# Patient Record
Sex: FEMALE | Race: WHITE
[De-identification: names, ages, dates, MRNs, and addresses within clinical notes are randomized per-mention and may not be internally consistent; named-entity substitution may affect disease eponyms.]

---

## 2021-10-07 ENCOUNTER — HOSPITAL ENCOUNTER (EMERGENCY)
Dept: HOSPITAL 54 - ER | Age: 81
LOS: 1 days | Discharge: HOME | End: 2021-10-08
Payer: MEDICARE

## 2021-10-07 VITALS — BODY MASS INDEX: 30.82 KG/M2 | HEIGHT: 60 IN | WEIGHT: 157 LBS

## 2021-10-07 DIAGNOSIS — R53.1: Primary | ICD-10-CM

## 2021-10-07 DIAGNOSIS — Z86.16: ICD-10-CM

## 2021-10-07 DIAGNOSIS — I10: ICD-10-CM

## 2021-10-07 LAB
ALBUMIN SERPL BCP-MCNC: 3.6 G/DL (ref 3.4–5)
ALP SERPL-CCNC: 66 U/L (ref 46–116)
ALT SERPL W P-5'-P-CCNC: 34 U/L (ref 12–78)
AST SERPL W P-5'-P-CCNC: 36 U/L (ref 15–37)
BASOPHILS # BLD AUTO: 0 K/UL (ref 0–0.2)
BASOPHILS NFR BLD AUTO: 0.5 % (ref 0–2)
BILIRUB DIRECT SERPL-MCNC: 0.2 MG/DL (ref 0–0.2)
BILIRUB SERPL-MCNC: 0.6 MG/DL (ref 0.2–1)
BUN SERPL-MCNC: 11 MG/DL (ref 7–18)
CALCIUM SERPL-MCNC: 7.9 MG/DL (ref 8.5–10.1)
CHLORIDE SERPL-SCNC: 100 MMOL/L (ref 98–107)
CO2 SERPL-SCNC: 28 MMOL/L (ref 21–32)
CREAT SERPL-MCNC: 0.8 MG/DL (ref 0.6–1.3)
EOSINOPHIL NFR BLD AUTO: 0 % (ref 0–6)
GLUCOSE SERPL-MCNC: 118 MG/DL (ref 74–106)
HCT VFR BLD AUTO: 41 % (ref 33–45)
HGB BLD-MCNC: 13.6 G/DL (ref 11.5–14.8)
LYMPHOCYTES NFR BLD AUTO: 0.9 K/UL (ref 0.8–4.8)
LYMPHOCYTES NFR BLD AUTO: 23 % (ref 20–44)
MCHC RBC AUTO-ENTMCNC: 33 G/DL (ref 31–36)
MCV RBC AUTO: 89 FL (ref 82–100)
MONOCYTES NFR BLD AUTO: 0.3 K/UL (ref 0.1–1.3)
MONOCYTES NFR BLD AUTO: 8.1 % (ref 2–12)
NEUTROPHILS # BLD AUTO: 2.6 K/UL (ref 1.8–8.9)
NEUTROPHILS NFR BLD AUTO: 68.4 % (ref 43–81)
PLATELET # BLD AUTO: 109 K/UL (ref 150–450)
POTASSIUM SERPL-SCNC: 3.7 MMOL/L (ref 3.5–5.1)
PROT SERPL-MCNC: 7.3 G/DL (ref 6.4–8.2)
RBC # BLD AUTO: 4.61 MIL/UL (ref 4–5.2)
SODIUM SERPL-SCNC: 138 MMOL/L (ref 136–145)
WBC NRBC COR # BLD AUTO: 3.8 K/UL (ref 4.3–11)

## 2021-10-07 NOTE — NUR
PATIENT BIBRA 878 PT FROM HOME C/O GENERAL WEAKNESS. PT TESTED POSTIVE FOR 
COVID 7 DAYS AGO. PATIENT IS A/O X 4, RR EVEN AND UNLABORED, NO SOB NOTED. 
PATIENT CONNECTED TO CARDIAC MONITOR AND POX.

## 2021-10-08 VITALS — DIASTOLIC BLOOD PRESSURE: 79 MMHG | SYSTOLIC BLOOD PRESSURE: 157 MMHG

## 2021-10-08 LAB
COLOR UR: YELLOW
PH UR STRIP: 6 [PH] (ref 5–8)
RBC #/AREA URNS HPF: (no result) /HPF (ref 0–2)
UROBILINOGEN UR STRIP-MCNC: 0.2 EU/DL
WBC #/AREA URNS HPF: (no result) /HPF (ref 0–3)

## 2023-09-26 NOTE — NUR
RAD AT BEDSIDE Banner Transposition Flap Text: The defect edges were debeveled with a #15 scalpel blade. Given the location of the defect and the proximity to free margins a Banner transposition flap was deemed most appropriate. Using a sterile surgical marker, an appropriate flap was drawn around the defect. The area thus outlined was incised deep to adipose tissue with a #15 scalpel blade. The skin margins were undermined to an appropriate distance in all directions utilizing iris scissors. Following this, the designed flap was carried into the primary defect and sutured into place.